# Patient Record
Sex: MALE | Race: WHITE | NOT HISPANIC OR LATINO | Employment: FULL TIME | URBAN - METROPOLITAN AREA
[De-identification: names, ages, dates, MRNs, and addresses within clinical notes are randomized per-mention and may not be internally consistent; named-entity substitution may affect disease eponyms.]

---

## 2017-09-07 ENCOUNTER — HOSPITAL ENCOUNTER (EMERGENCY)
Facility: MEDICAL CENTER | Age: 37
End: 2017-09-07
Attending: EMERGENCY MEDICINE
Payer: COMMERCIAL

## 2017-09-07 VITALS
WEIGHT: 257.94 LBS | SYSTOLIC BLOOD PRESSURE: 133 MMHG | BODY MASS INDEX: 34.04 KG/M2 | OXYGEN SATURATION: 95 % | HEART RATE: 95 BPM | RESPIRATION RATE: 18 BRPM | TEMPERATURE: 98.5 F | DIASTOLIC BLOOD PRESSURE: 87 MMHG

## 2017-09-07 DIAGNOSIS — K64.4 EXTERNAL HEMORRHOID, BLEEDING: ICD-10-CM

## 2017-09-07 LAB
ALBUMIN SERPL BCP-MCNC: 4.8 G/DL (ref 3.2–4.9)
ALBUMIN/GLOB SERPL: 1.6 G/DL
ALP SERPL-CCNC: 64 U/L (ref 30–99)
ALT SERPL-CCNC: 49 U/L (ref 2–50)
ANION GAP SERPL CALC-SCNC: 12 MMOL/L (ref 0–11.9)
AST SERPL-CCNC: 31 U/L (ref 12–45)
BASOPHILS # BLD AUTO: 0.4 % (ref 0–1.8)
BASOPHILS # BLD: 0.04 K/UL (ref 0–0.12)
BILIRUB SERPL-MCNC: 0.6 MG/DL (ref 0.1–1.5)
BUN SERPL-MCNC: 16 MG/DL (ref 8–22)
CALCIUM SERPL-MCNC: 9.8 MG/DL (ref 8.5–10.5)
CHLORIDE SERPL-SCNC: 104 MMOL/L (ref 96–112)
CO2 SERPL-SCNC: 24 MMOL/L (ref 20–33)
CREAT SERPL-MCNC: 0.73 MG/DL (ref 0.5–1.4)
EOSINOPHIL # BLD AUTO: 0.08 K/UL (ref 0–0.51)
EOSINOPHIL NFR BLD: 0.8 % (ref 0–6.9)
ERYTHROCYTE [DISTWIDTH] IN BLOOD BY AUTOMATED COUNT: 41.5 FL (ref 35.9–50)
GFR SERPL CREATININE-BSD FRML MDRD: >60 ML/MIN/1.73 M 2
GLOBULIN SER CALC-MCNC: 3 G/DL (ref 1.9–3.5)
GLUCOSE SERPL-MCNC: 86 MG/DL (ref 65–99)
HCT VFR BLD AUTO: 42.4 % (ref 42–52)
HGB BLD-MCNC: 15.1 G/DL (ref 14–18)
IMM GRANULOCYTES # BLD AUTO: 0.02 K/UL (ref 0–0.11)
IMM GRANULOCYTES NFR BLD AUTO: 0.2 % (ref 0–0.9)
LIPASE SERPL-CCNC: 7 U/L (ref 11–82)
LYMPHOCYTES # BLD AUTO: 1.97 K/UL (ref 1–4.8)
LYMPHOCYTES NFR BLD: 20.9 % (ref 22–41)
MCH RBC QN AUTO: 32 PG (ref 27–33)
MCHC RBC AUTO-ENTMCNC: 35.6 G/DL (ref 33.7–35.3)
MCV RBC AUTO: 89.8 FL (ref 81.4–97.8)
MONOCYTES # BLD AUTO: 0.63 K/UL (ref 0–0.85)
MONOCYTES NFR BLD AUTO: 6.7 % (ref 0–13.4)
NEUTROPHILS # BLD AUTO: 6.69 K/UL (ref 1.82–7.42)
NEUTROPHILS NFR BLD: 71 % (ref 44–72)
NRBC # BLD AUTO: 0 K/UL
NRBC BLD AUTO-RTO: 0 /100 WBC
PLATELET # BLD AUTO: 253 K/UL (ref 164–446)
PMV BLD AUTO: 9.7 FL (ref 9–12.9)
POTASSIUM SERPL-SCNC: 3.5 MMOL/L (ref 3.6–5.5)
PROT SERPL-MCNC: 7.8 G/DL (ref 6–8.2)
RBC # BLD AUTO: 4.72 M/UL (ref 4.7–6.1)
SODIUM SERPL-SCNC: 140 MMOL/L (ref 135–145)
WBC # BLD AUTO: 9.4 K/UL (ref 4.8–10.8)

## 2017-09-07 PROCEDURE — 85025 COMPLETE CBC W/AUTO DIFF WBC: CPT

## 2017-09-07 PROCEDURE — 80053 COMPREHEN METABOLIC PANEL: CPT

## 2017-09-07 PROCEDURE — 36415 COLL VENOUS BLD VENIPUNCTURE: CPT

## 2017-09-07 PROCEDURE — 46600 DIAGNOSTIC ANOSCOPY SPX: CPT

## 2017-09-07 PROCEDURE — 83690 ASSAY OF LIPASE: CPT

## 2017-09-07 PROCEDURE — 99283 EMERGENCY DEPT VISIT LOW MDM: CPT

## 2017-09-07 ASSESSMENT — PAIN SCALES - GENERAL: PAINLEVEL_OUTOF10: 2

## 2017-09-07 NOTE — ED NOTES
Abdominal pain protocol ordered.  Blood drawn and sent to lab.  Vitals reassessed.  Pt updated on wait time and escorted back to lobby.

## 2017-09-07 NOTE — ED NOTES
"Chief Complaint   Patient presents with   • Bloody Stools     pt reports Hx hemorrhoids, today he had \"blackish jelly stuff,\" in stool. has been having some abdominal and flank pain worse on left.      Pt is PWD.   Blood pressure 147/103, pulse (!) 108, temperature 37.2 °C (99 °F), resp. rate 14, weight 117 kg (257 lb 15 oz), SpO2 95 %.    Pt informed of wait times. Educated on triage process.  Asked to return to triage RN for any new or worsening of symptoms. Thanked for patience.        "

## 2017-09-08 NOTE — DISCHARGE INSTRUCTIONS
Fiber Content in Foods  Drinking plenty of fluids and consuming foods high in fiber can help with constipation. See the list below for the fiber content of some common foods.  Starches and Grains / Dietary Fiber (g)  · Cheerios, 1 cup / 3 g  · An[employer]'s Corn Flakes, 1 cup / 0.7 g  · Rice Krispies, 1 ¼ cup / 0.3 g  · [employer] Oat Life Cereal, ¾ cup / 2.1 g  · Oatmeal, instant (cooked), ½ cup / 2 g  · Joan's Frosted Mini Wheats, 1 cup / 5.1 g  · Rice, brown, long-grain (cooked), 1 cup / 3.5 g  · Rice, white, long-grain (cooked), 1 cup / 0.6 g  · Macaroni, cooked, enriched, 1 cup / 2.5 g  Legumes / Dietary Fiber (g)  · Beans, baked, canned, plain or vegetarian, ½ cup / 5.2 g  · Beans, kidney, canned, ½ cup / 6.8 g  · Beans, hernandez, dried (cooked), ½ cup / 7.7 g  · Beans, hernandez, canned, ½ cup / 5.5 g  Breads and Crackers / Dietary Fiber (g)  · Luc crackers, plain or honey, 2 squares / 0.7 g  · Saltine crackers, 3 squares / 0.3 g  · Pretzels, plain, salted, 10 pieces / 1.8 g  · Bread, whole-wheat, 1 slice / 1.9 g  · Bread, white, 1 slice / 0.7 g  · Bread, raisin, 1 slice / 1.2 g  · Bagel, plain, 3 oz / 2 g  · Tortilla, flour, 1 oz / 0.9 g  · Tortilla, corn, 1 small / 1.5 g  · Bun, hamburger or hotdog, 1 small / 0.9 g  Fruits / Dietary Fiber (g)  · Apple, raw with skin, 1 medium / 4.4 g  · Applesauce, sweetened, ½ cup / 1.5 g  · Banana, ½ medium / 1.5 g  · Grapes, 10 grapes / 0.4 g  · Orange, 1 small / 2.3 g  · Raisin, 1.5 oz / 1.6 g  · Melon, 1 cup / 1.4 g  Vegetables / Dietary Fiber (g)  · Green beans, canned, ½ cup / 1.3 g  · Carrots (cooked), ½ cup / 2.3 g  · Broccoli (cooked), ½ cup / 2.8 g  · Peas, frozen (cooked), ½ cup / 4.4 g  · Potatoes, mashed, ½ cup / 1.6 g  · Lettuce, 1 cup / 0.5 g  · Corn, canned, ½ cup / 1.6 g  · Tomato, ½ cup / 1.1 g  Document Released: 05/05/2008 Document Revised: 03/11/2013 Document Reviewed: 06/30/2008  ExitCare® Patient Information ©2014 [employer], [facility].    Hemorrhoids  Hemorrhoids  are swollen veins around the rectum or anus. There are two types of hemorrhoids:   · Internal hemorrhoids. These occur in the veins just inside the rectum. They may poke through to the outside and become irritated and painful.  · External hemorrhoids. These occur in the veins outside the anus and can be felt as a painful swelling or hard lump near the anus.  CAUSES  · Pregnancy.    · Obesity.    · Constipation or diarrhea.    · Straining to have a bowel movement.    · Sitting for long periods on the toilet.  · Heavy lifting or other activity that caused you to strain.  · Anal intercourse.  SYMPTOMS   · Pain.    · Anal itching or irritation.    · Rectal bleeding.    · Fecal leakage.    · Anal swelling.    · One or more lumps around the anus.    DIAGNOSIS   Your caregiver may be able to diagnose hemorrhoids by visual examination. Other examinations or tests that may be performed include:   · Examination of the rectal area with a gloved hand (digital rectal exam).    · Examination of anal canal using a small tube (scope).    · A blood test if you have lost a significant amount of blood.  · A test to look inside the colon (sigmoidoscopy or colonoscopy).  TREATMENT  Most hemorrhoids can be treated at home. However, if symptoms do not seem to be getting better or if you have a lot of rectal bleeding, your caregiver may perform a procedure to help make the hemorrhoids get smaller or remove them completely. Possible treatments include:   · Placing a rubber band at the base of the hemorrhoid to cut off the circulation (rubber band ligation).    · Injecting a chemical to shrink the hemorrhoid (sclerotherapy).    · Using a tool to burn the hemorrhoid (infrared light therapy).    · Surgically removing the hemorrhoid (hemorrhoidectomy).    · Stapling the hemorrhoid to block blood flow to the tissue (hemorrhoid stapling).    HOME CARE INSTRUCTIONS   · Eat foods with fiber, such as whole grains, beans, nuts, fruits, and  vegetables. Ask your doctor about taking products with added fiber in them (fiber supplements).  · Increase fluid intake. Drink enough water and fluids to keep your urine clear or pale yellow.    · Exercise regularly.    · Go to the bathroom when you have the urge to have a bowel movement. Do not wait.    · Avoid straining to have bowel movements.    · Keep the anal area dry and clean. Use wet toilet paper or moist towelettes after a bowel movement.    · Medicated creams and suppositories may be used or applied as directed.    · Only take over-the-counter or prescription medicines as directed by your caregiver.    · Take warm sitz baths for 15-20 minutes, 3-4 times a day to ease pain and discomfort.    · Place ice packs on the hemorrhoids if they are tender and swollen. Using ice packs between sitz baths may be helpful.    ¨ Put ice in a plastic bag.    ¨ Place a towel between your skin and the bag.    ¨ Leave the ice on for 15-20 minutes, 3-4 times a day.    · Do not use a donut-shaped pillow or sit on the toilet for long periods. This increases blood pooling and pain.    SEEK MEDICAL CARE IF:  · You have increasing pain and swelling that is not controlled by treatment or medicine.  · You have uncontrolled bleeding.  · You have difficulty or you are unable to have a bowel movement.  · You have pain or inflammation outside the area of the hemorrhoids.  MAKE SURE YOU:  · Understand these instructions.  · Will watch your condition.  · Will get help right away if you are not doing well or get worse.     This information is not intended to replace advice given to you by your health care provider. Make sure you discuss any questions you have with your health care provider.     Document Released: 12/15/2001 Document Revised: 12/04/2013 Document Reviewed: 10/22/2013  [employer] Interactive Patient Education ©2016 [employer]    How to Take a Sitz Bath  A sitz bath is a warm water bath that is taken while you are sitting  down. The water should only come up to your hips and should cover your buttocks. Your health care provider may recommend a sitz bath to help you:   · Clean the lower part of your body, including your genital area.  · With itching.  · With pain.  · With sore muscles or muscles that tighten or spasm.  HOW TO TAKE A SITZ BATH  Take 3-4 sitz baths per day or as told by your health care provider.  1. Partially fill a bathtub with warm water. You will only need the water to be deep enough to cover your hips and buttocks when you are sitting in it.  2. If your health care provider told you to put medicine in the water, follow the directions exactly.  3. Sit in the water and open the tub drain a little.  4. Turn on the warm water again to keep the tub at the correct level. Keep the water running constantly.  5. Soak in the water for 15-20 minutes or as told by your health care provider.  6. After the sitz bath, pat the affected area dry first. Do not rub it.  7. Be careful when you stand up after the sitz bath because you may feel dizzy.  SEEK MEDICAL CARE IF:  · Your symptoms get worse. Do not continue with sitz baths if your symptoms get worse.  · You have new symptoms. Do not continue with sitz baths until you talk with your health care provider.     This information is not intended to replace advice given to you by your health care provider. Make sure you discuss any questions you have with your health care provider.     Document Released: 09/09/2005 Document Revised: 05/03/2016 Document Reviewed: 12/16/2015  [employer] Interactive Patient Education ©2016 [employer]

## 2017-09-08 NOTE — ED NOTES
Discharge instructions provided & verbalized understanding of follow-up care & reasons to return to ED.  Released in stable condition.

## 2017-09-08 NOTE — ED PROVIDER NOTES
"ED Provider Note    Scribed for Nedra Tang M.D. by Thai Purdy. 9/7/2017, 10:40 PM.    Primary care provider: Pt States None  Means of arrival: Walk-in  History obtained from: Patient  History limited by: None    CHIEF COMPLAINT  Chief Complaint   Patient presents with   • Bloody Stools     pt reports Hx hemorrhoids, today he had \"blackish jelly stuff,\" in stool. has been having some abdominal and flank pain worse on left.        HPI  Simone Sanchez is a 36 y.o. male who presents to the Emergency Department with blood in stool. Patient was at work today when he had a bowel movement and noticed blood on the tissue. He describes this as \"black jellylike substance\" on the tissue though is only a small amount. The patient has also had diarrhea and intermittent lower abdominal discomfort for five days. He denies fever, chills, nausea, vomiting, weakness, dizziness, or other symptoms. Patient has a history of hemorrhoids    REVIEW OF SYSTEMS  See HPI for further details. All other systems are negative. C.    PAST MEDICAL HISTORY   Hemorrhoids     SURGICAL HISTORY  patient denies any surgical history    SOCIAL HISTORY  Social History   Substance Use Topics   • Smoking status: Former Smoker     Years: 15.00     Quit date: 4/21/2013   • Alcohol use Yes      Comment: 2 beers in a week.      History   Drug Use No       FAMILY HISTORY  No contributing family history noted.     CURRENT MEDICATIONS  Reviewed. See Encounter Summary.     ALLERGIES  No Known Allergies    PHYSICAL EXAM  VITAL SIGNS: /94   Pulse 87   Temp 36.9 °C (98.5 °F)   Resp 16   Wt 117 kg (257 lb 15 oz)   SpO2 95%   BMI 34.04 kg/m²    Pulse ox interpretation: I interpret this pulse ox as normal.  Constitutional: Alert in no apparent distress.  HENT: No signs of trauma, Bilateral external ears normal, Nose normal.   Eyes: Pupils are equal and reactive, Conjunctiva normal, Non-icteric.   Cardiovascular: Regular rate and rhythm, no murmurs. "   Thorax & Lungs: Normal breath sounds, No respiratory distress, No wheezing, No chest tenderness.   Abdomen: Bowel sounds normal, Soft, questionable mild left lower quadrant tenderness, No masses, No pulsatile masses. No peritoneal signs.  Skin: Warm, Dry, No erythema, No rash.   Extremities: Intact distal pulses, No edema, No tenderness, No cyanosis,  Negative Angela's sign.   Musculoskeletal: Good range of motion in all major joints. No tenderness to palpation or major deformities noted.   Rectal: 3 mm external hemorrhoid at the 3 o'clock position. Several other external hemorrhoids noted that are not actively bleeding and are not thrombosed.   Neurologic: Alert , Normal motor function, Normal sensory function, No focal deficits noted.   Psychiatric: Affect normal, Judgment normal, Mood normal.         DIFFERENTIAL DIAGNOSIS AND WORK UP PLAN    10:40 PM Patient seen and examined at bedside. I explained to the patient his labs appear normal so far and that I will complete a rectal exam to evaluate. The patient presents with hematochezia and the differential diagnosis includes but is not limited to internal vs hemorrhoids, colitis, diverticulitis, less likely GI bleed. Initial orders included eGFR, CBC, CMP, lipase, POC urinalysis.     10:54 PM   Anoscopy Procedure  Indication: Rectal bleeding    Procedure: The patient was placed in the right lateral decubitus position.  External inspection of the rectum and perianal area revealed an external hemorrhoid at the 3 o'clock position not thrombosed mildly oozing 3mm.  A digital rectal exam was not performed. The anoscope was gently inserted and the bowel was inspected.  Visualization was excellent.  Mucosa was normal.  Anoscopy revealed an internal hemorrhoid at the 6 o'clock position not bleeding.    The patient tolerated the procedure well.    Complication: None       DIAGNOSTIC STUDIES / PROCEDURES     LABS  CBC CMP within normal limits lipase negative no evidence of  anemia or acute kidney injury or evidence of infection  All labs were reviewed by me.    COURSE & MEDICAL DECISION MAKING  Pertinent Labs & Imaging studies reviewed. (See chart for details)      Rectal exam completed with findings outlined above with evidence of a small mildly using external hemorrhoid without evidence of thrombosis and an internal hemorrhoid without evidence of bleeding. I explained that he is now stable for discharge and we discussed sitz baths for alleviation of his symptoms. I advised him to follow up with his primary care provider and to return to the ED for fever, worsening symptoms, or any other medical concerns. He understands and will comply.    /87   Pulse 95   Temp 36.9 °C (98.5 °F)   Resp 18   Wt 117 kg (257 lb 15 oz)   SpO2 95%   BMI 34.04 kg/m²     The patient will return for new or worsening symptoms and is stable at the time of discharge.    The patient is referred to a primary physician for blood pressure management, diabetic screening, and for all other preventative health concerns.    DISPOSITION:  Patient will be discharged home in stable condition.    FOLLOW UP:  [address] [id number] [phone]  Schedule an appointment as soon as possible for a visit  for reassessment and blood pressure management    [facility], Emergency Dept  [address] [facility] [id number] [phone]    If symptoms worsen        FINAL IMPRESSION  1. External hemorrhoid, bleeding          Thai AGUILAR (Scribe), am scribing for, and in the presence of, Nedra Tang M.D..    Electronically signed by: Thai Purdy ([place]), 9/7/2017    Nedra AGUILAR M.D. personally performed the services described in this documentation, as scribed by Thai Purdy in my presence, and it is both accurate and complete.    The note accurately reflects work and decisions made by me.  Nedra Tang  9/8/2017  1:19 AM    This  dictation has been created using voice recognition software and/or scribes. The accuracy of the dictation is limited by the abilities of the software and the expertise of the scribes. I expect there may be some errors of grammar and possibly content. I made every attempt to manually correct the errors within my dictation. However, errors related to voice recognition software and/or scribes may still exist and should be interpreted within the appropriate context.

## 2019-08-08 ENCOUNTER — HOSPITAL ENCOUNTER (EMERGENCY)
Facility: MEDICAL CENTER | Age: 39
End: 2019-08-08
Attending: EMERGENCY MEDICINE

## 2019-08-08 ENCOUNTER — APPOINTMENT (OUTPATIENT)
Dept: RADIOLOGY | Facility: MEDICAL CENTER | Age: 39
End: 2019-08-08
Attending: EMERGENCY MEDICINE

## 2019-08-08 ENCOUNTER — APPOINTMENT (OUTPATIENT)
Dept: RADIOLOGY | Facility: MEDICAL CENTER | Age: 39
End: 2019-08-08
Attending: NURSE PRACTITIONER

## 2019-08-08 VITALS
RESPIRATION RATE: 16 BRPM | WEIGHT: 254.41 LBS | HEIGHT: 73 IN | BODY MASS INDEX: 33.72 KG/M2 | OXYGEN SATURATION: 94 % | HEART RATE: 75 BPM | SYSTOLIC BLOOD PRESSURE: 152 MMHG | TEMPERATURE: 98.1 F | DIASTOLIC BLOOD PRESSURE: 87 MMHG

## 2019-08-08 DIAGNOSIS — R51.9 ACUTE NONINTRACTABLE HEADACHE, UNSPECIFIED HEADACHE TYPE: ICD-10-CM

## 2019-08-08 DIAGNOSIS — R47.9 DIFFICULTY WITH SPEECH: ICD-10-CM

## 2019-08-08 LAB
ABO GROUP BLD: NORMAL
ALBUMIN SERPL BCP-MCNC: 4.7 G/DL (ref 3.2–4.9)
ALBUMIN/GLOB SERPL: 1.5 G/DL
ALP SERPL-CCNC: 67 U/L (ref 30–99)
ALT SERPL-CCNC: 48 U/L (ref 2–50)
ANION GAP SERPL CALC-SCNC: 7 MMOL/L (ref 0–11.9)
APPEARANCE UR: CLEAR
APTT PPP: 29.8 SEC (ref 24.7–36)
AST SERPL-CCNC: 26 U/L (ref 12–45)
BASOPHILS # BLD AUTO: 0.3 % (ref 0–1.8)
BASOPHILS # BLD: 0.03 K/UL (ref 0–0.12)
BILIRUB SERPL-MCNC: 0.6 MG/DL (ref 0.1–1.5)
BILIRUB UR QL STRIP.AUTO: NEGATIVE
BLD GP AB SCN SERPL QL: NORMAL
BUN SERPL-MCNC: 14 MG/DL (ref 8–22)
CALCIUM SERPL-MCNC: 10 MG/DL (ref 8.5–10.5)
CHLORIDE SERPL-SCNC: 102 MMOL/L (ref 96–112)
CO2 SERPL-SCNC: 27 MMOL/L (ref 20–33)
COLOR UR: YELLOW
CREAT SERPL-MCNC: 0.98 MG/DL (ref 0.5–1.4)
EKG IMPRESSION: NORMAL
EOSINOPHIL # BLD AUTO: 0.04 K/UL (ref 0–0.51)
EOSINOPHIL NFR BLD: 0.5 % (ref 0–6.9)
ERYTHROCYTE [DISTWIDTH] IN BLOOD BY AUTOMATED COUNT: 42.2 FL (ref 35.9–50)
GLOBULIN SER CALC-MCNC: 3.1 G/DL (ref 1.9–3.5)
GLUCOSE SERPL-MCNC: 137 MG/DL (ref 65–99)
GLUCOSE UR STRIP.AUTO-MCNC: 500 MG/DL
HCT VFR BLD AUTO: 44.1 % (ref 42–52)
HGB BLD-MCNC: 14.8 G/DL (ref 14–18)
IMM GRANULOCYTES # BLD AUTO: 0.03 K/UL (ref 0–0.11)
IMM GRANULOCYTES NFR BLD AUTO: 0.3 % (ref 0–0.9)
INR PPP: 1.03 (ref 0.87–1.13)
KETONES UR STRIP.AUTO-MCNC: NEGATIVE MG/DL
LEUKOCYTE ESTERASE UR QL STRIP.AUTO: NEGATIVE
LYMPHOCYTES # BLD AUTO: 1.35 K/UL (ref 1–4.8)
LYMPHOCYTES NFR BLD: 15.3 % (ref 22–41)
MCH RBC QN AUTO: 30.8 PG (ref 27–33)
MCHC RBC AUTO-ENTMCNC: 33.6 G/DL (ref 33.7–35.3)
MCV RBC AUTO: 91.7 FL (ref 81.4–97.8)
MICRO URNS: ABNORMAL
MONOCYTES # BLD AUTO: 0.72 K/UL (ref 0–0.85)
MONOCYTES NFR BLD AUTO: 8.2 % (ref 0–13.4)
NEUTROPHILS # BLD AUTO: 6.65 K/UL (ref 1.82–7.42)
NEUTROPHILS NFR BLD: 75.4 % (ref 44–72)
NITRITE UR QL STRIP.AUTO: NEGATIVE
NRBC # BLD AUTO: 0 K/UL
NRBC BLD-RTO: 0 /100 WBC
PH UR STRIP.AUTO: 5.5 [PH] (ref 5–8)
PLATELET # BLD AUTO: 235 K/UL (ref 164–446)
PMV BLD AUTO: 9.4 FL (ref 9–12.9)
POTASSIUM SERPL-SCNC: 3.8 MMOL/L (ref 3.6–5.5)
PROT SERPL-MCNC: 7.8 G/DL (ref 6–8.2)
PROT UR QL STRIP: NEGATIVE MG/DL
PROTHROMBIN TIME: 13.8 SEC (ref 12–14.6)
RBC # BLD AUTO: 4.81 M/UL (ref 4.7–6.1)
RBC UR QL AUTO: NEGATIVE
RH BLD: NORMAL
SODIUM SERPL-SCNC: 136 MMOL/L (ref 135–145)
SP GR UR STRIP.AUTO: 1.04
TROPONIN T SERPL-MCNC: <6 NG/L (ref 6–19)
UROBILINOGEN UR STRIP.AUTO-MCNC: 0.2 MG/DL
WBC # BLD AUTO: 8.8 K/UL (ref 4.8–10.8)

## 2019-08-08 PROCEDURE — 99244 OFF/OP CNSLTJ NEW/EST MOD 40: CPT

## 2019-08-08 PROCEDURE — 70498 CT ANGIOGRAPHY NECK: CPT

## 2019-08-08 PROCEDURE — 70496 CT ANGIOGRAPHY HEAD: CPT

## 2019-08-08 PROCEDURE — 700117 HCHG RX CONTRAST REV CODE 255: Performed by: EMERGENCY MEDICINE

## 2019-08-08 PROCEDURE — 99284 EMERGENCY DEPT VISIT MOD MDM: CPT

## 2019-08-08 PROCEDURE — 700105 HCHG RX REV CODE 258: Performed by: NURSE PRACTITIONER

## 2019-08-08 PROCEDURE — 85730 THROMBOPLASTIN TIME PARTIAL: CPT

## 2019-08-08 PROCEDURE — 70450 CT HEAD/BRAIN W/O DYE: CPT

## 2019-08-08 PROCEDURE — 71045 X-RAY EXAM CHEST 1 VIEW: CPT

## 2019-08-08 PROCEDURE — 84484 ASSAY OF TROPONIN QUANT: CPT

## 2019-08-08 PROCEDURE — 86901 BLOOD TYPING SEROLOGIC RH(D): CPT

## 2019-08-08 PROCEDURE — 700111 HCHG RX REV CODE 636 W/ 250 OVERRIDE (IP): Performed by: NURSE PRACTITIONER

## 2019-08-08 PROCEDURE — 85610 PROTHROMBIN TIME: CPT

## 2019-08-08 PROCEDURE — 86850 RBC ANTIBODY SCREEN: CPT

## 2019-08-08 PROCEDURE — 36415 COLL VENOUS BLD VENIPUNCTURE: CPT

## 2019-08-08 PROCEDURE — 96375 TX/PRO/DX INJ NEW DRUG ADDON: CPT

## 2019-08-08 PROCEDURE — 94760 N-INVAS EAR/PLS OXIMETRY 1: CPT

## 2019-08-08 PROCEDURE — 96366 THER/PROPH/DIAG IV INF ADDON: CPT

## 2019-08-08 PROCEDURE — 85025 COMPLETE CBC W/AUTO DIFF WBC: CPT

## 2019-08-08 PROCEDURE — 80053 COMPREHEN METABOLIC PANEL: CPT

## 2019-08-08 PROCEDURE — 96365 THER/PROPH/DIAG IV INF INIT: CPT

## 2019-08-08 PROCEDURE — 81003 URINALYSIS AUTO W/O SCOPE: CPT

## 2019-08-08 PROCEDURE — 0042T CT-CEREBRAL PERFUSION ANALYSIS: CPT

## 2019-08-08 PROCEDURE — 86900 BLOOD TYPING SEROLOGIC ABO: CPT

## 2019-08-08 PROCEDURE — 70551 MRI BRAIN STEM W/O DYE: CPT

## 2019-08-08 PROCEDURE — 93005 ELECTROCARDIOGRAM TRACING: CPT | Performed by: EMERGENCY MEDICINE

## 2019-08-08 RX ORDER — DIPHENHYDRAMINE HYDROCHLORIDE 50 MG/ML
25 INJECTION INTRAMUSCULAR; INTRAVENOUS ONCE
Status: COMPLETED | OUTPATIENT
Start: 2019-08-08 | End: 2019-08-08

## 2019-08-08 RX ORDER — MAGNESIUM SULFATE HEPTAHYDRATE 40 MG/ML
2 INJECTION, SOLUTION INTRAVENOUS ONCE
Status: COMPLETED | OUTPATIENT
Start: 2019-08-08 | End: 2019-08-08

## 2019-08-08 RX ORDER — SODIUM CHLORIDE 9 MG/ML
1000 INJECTION, SOLUTION INTRAVENOUS ONCE
Status: COMPLETED | OUTPATIENT
Start: 2019-08-08 | End: 2019-08-08

## 2019-08-08 RX ADMIN — PROCHLORPERAZINE EDISYLATE 10 MG: 5 INJECTION INTRAMUSCULAR; INTRAVENOUS at 15:28

## 2019-08-08 RX ADMIN — MAGNESIUM SULFATE IN WATER 2 G: 40 INJECTION, SOLUTION INTRAVENOUS at 15:30

## 2019-08-08 RX ADMIN — IOHEXOL 100 ML: 350 INJECTION, SOLUTION INTRAVENOUS at 14:50

## 2019-08-08 RX ADMIN — DIPHENHYDRAMINE HYDROCHLORIDE 25 MG: 50 INJECTION INTRAMUSCULAR; INTRAVENOUS at 15:27

## 2019-08-08 RX ADMIN — SODIUM CHLORIDE 1000 ML: 9 INJECTION, SOLUTION INTRAVENOUS at 15:26

## 2019-08-08 ASSESSMENT — LIFESTYLE VARIABLES: DO YOU DRINK ALCOHOL: NO

## 2019-08-08 NOTE — ED TRIAGE NOTES
".  Chief Complaint   Patient presents with   • Headache     right side \"weird feeling\"   • Difficulty Swallowing     ./98   Pulse 98   Temp 36.7 °C (98.1 °F) (Temporal)   Resp 18   Ht 1.854 m (6' 1\")   Wt 115.4 kg (254 lb 6.6 oz)   SpO2 92%   BMI 33.57 kg/m²     BIB EMS with above complaints, reports \"weird sensation\" starting in right head and traveling down neck, difficulty swallowing, (+) nausea, symptoms began at 1215, denies N/T at this time, no facial droop, no slurred speech noted, POOLE =  "

## 2019-08-08 NOTE — CONSULTS
"Chief Complaint   Patient presents with   • Headache     right side \"weird feeling\"   • Difficulty Swallowing       Problem List Items Addressed This Visit     None      Neurology Stroke Alert Consultation     History of present illness:  This is a 38-year old male with PMhx significant for hypertension and tobacco abuse who presented to [facility] on 8/8/19 for a chief complaint of Right sided headache, with associated Right facial and Right hand tingling/abnormal sensation. The patient was reportedly in his usual state of health this afternoon when he suddenly (aroun 1215) began to experience the above symptoms; also had mild associated nausea without vomiting. Symptoms persisted and did not improve thus patient called EMS and was brought to ED for further evaluation. At time of presentation here, /98, . CT head revealed no acute intracranial abnormality. CTA head/neck without LVO or other acute vascular process. Patient ultimately determined not to be a candidate for IV tPA secondary to mild/non disabling deficits, NIHSS 0.   Currently, patient is sitting up in stretcher, awake and alert. Admits to persistent headache as above, mildly improved; continues to have mild associated nausea as well as mild photophobia/light sensitivity. Denies problem with vision (double vision, blurred vision, loss of vision or visual scotoma), speech or swallowing. Denies weakness or numbness to any part of the body; sensation to Right hand (lateral aspect) is resolving. Denies recent or remote history of falls or trauma/head trauma. Denies history of previous similar episodes, history of stroke or history of migraine/migraine with stroke symptoms.     Neurology has been consulted by Dr. Micheal Porras to further evaluate the findings noted above.     Past medical history:   As noted above.     Past surgical history:   Non contributory.     Family history:   No family history of migraines or stroke.     Social " history:   Social History     Socioeconomic History   • Marital status: Single     Spouse name: Not on file   • Number of children: Not on file   • Years of education: Not on file   • Highest education level: Not on file   Occupational History   • Not on file   Social Needs   • Financial resource strain: Not on file   • Food insecurity:     Worry: Not on file     Inability: Not on file   • Transportation needs:     Medical: Not on file     Non-medical: Not on file   Tobacco Use   • Smoking status: Former Smoker     Years: 15.00     Last attempt to quit: 2013     Years since quittin.3   Substance and Sexual Activity   • Alcohol use: Yes     Comment: 2 beers in a week.   • Drug use: No   • Sexual activity: Not on file   Lifestyle   • Physical activity:     Days per week: Not on file     Minutes per session: Not on file   • Stress: Not on file   Relationships   • Social connections:     Talks on phone: Not on file     Gets together: Not on file     Attends Mandaeism service: Not on file     Active member of club or organization: Not on file     Attends meetings of clubs or organizations: Not on file     Relationship status: Not on file   • Intimate partner violence:     Fear of current or ex partner: Not on file     Emotionally abused: Not on file     Physically abused: Not on file     Forced sexual activity: Not on file   Other Topics Concern   • Not on file   Social History Narrative   • Not on file       Current medications:   Current Facility-Administered Medications   Medication Dose   • magnesium sulfate IVPB premix 2 g  2 g     No current outpatient medications on file.       Medication Allergy:  No Known Allergies    Review of systems:   Constitutional: denies fever, night sweats, weight loss.   Eyes: denies acute vision change, eye pain or secretion.   Ears, Nose, Mouth, Throat: denies nasal secretion, nasal bleeding, difficulty swallowing, hearing loss, tinnitus, vertigo, ear pain, acute dental  "problems, oral ulcers or lesions.   Endocrine: denies recent weight changes, heat or cold intolerance, polyuria, polydypsia, polyphagia,abnormal hair growth.  Cardiovascular: denies new onset of chest pain, palpitations, syncope, or dyspnea of exertion.  Pulmonary: denies shortness of breath, new onset of cough, hemoptysis, wheezing, chest pain or flu-like symptoms.   GI: denies nausea, vomiting, diarrhea, GI bleeding, change in appetite, abdominal pain, and change in bowel habits.  : denies dysuria, urinary incontinence, hematuria.  Heme/oncology: denies history of easy bruising or bleeding. No history of cancer, DVTor PE.  Allergy/immunology: denies hives/urticaria, or itching.   Dermatologic: denies new rash, or new skin lesions.  Musculoskeletal:denies joint swelling or pain, muscle pain, neck and back pain.   Neurologic:As noted above.   Psychiatric: denies symptoms of depression, anxiety, hallucinations, mood swings or changes, suicidal or homicidal thoughts.     Physical examination:   Vitals:    08/08/19 1413 08/08/19 1416   BP: 140/98    Pulse: 98    Resp: 18    Temp: 36.7 °C (98.1 °F)    TempSrc: Temporal    SpO2: 92%    Weight:  115.4 kg (254 lb 6.6 oz)   Height: 1.854 m (6' 1\")      General: Patient in no acute distress  HEENT: Normocephalic, no signs of acute trauma.   Neck: supple, no meningeal signs or carotid bruits. There is normal range of motion. No tenderness on exam.   Chest: clear to auscultation. No cough.   CV: RRR, no murmurs.   Skin: no signs of acute rashes or trauma.   Musculoskeletal: joints exhibit full range of motion, without any pain to palpation. There are no signs of joint or muscle swelling. There is no tenderness to deep palpation of muscles.   Psychiatric: No hallucinatory behavior. Denies symptoms of depression or suicidal ideation. Mood and affect appear normal on exam.     NEUROLOGICAL EXAM:   Mental status, orientation: Awake, alert and fully oriented.   Speech and " language: speech is clear and fluent. The patient is able to name, repeat and comprehend.   Memory: There is intact recollection of recent and remote events.   Cranial nerve exam: Pupils are 3-4 mm bilaterally and equally reactive to light and accommodation. Visual fields are intact by confrontation. There is no nystagmus on primary or secondary gaze. Intact full EOM in all directions of gaze. Face appears symmetric. Sensation in the face is intact to light touch. Uvula is midline. Palate elevates symmetrically. Tongue is midline and without any signs of tongue biting or fasciculations. Shoulder shrug is intact bilaterally.   Motor exam: Strength is 5/5 in all extremities. Tone is normal. No abnormal movements were seen on exam.   Sensory exam reveals normal sense of light touch and pinprick in all extremities.   Deep tendon reflexes:  2+ throughout. Plantar responses are flexor. There is no clonus.   Coordination: shows a normal finger-nose-finger. Normal rapidly alternating movements.   Gait: The patient was able to get up from seated position on first attempt without requiring assistance. Found to be steady when walking.      NIH Stroke Scale    1a Level of Consciousness   1b Orientation Questions   1c Response to Commands   2 Gaze   3 Visual Fields   4 Facial Movement   5 Motor Function (arm)   a Left   b Right   6 Motor Function (leg)   a Left   b Right   7 Limb Ataxia   8 Sensory   9 Language   10 Articulation   11 Extinction/Inattention     Score: 0    ANCILLARY DATA REVIEWED:     Lab Data Review:  Recent Results (from the past 24 hour(s))   CBC WITH DIFFERENTIAL    Collection Time: 08/08/19  2:26 PM   Result Value Ref Range    WBC 8.8 4.8 - 10.8 K/uL    RBC 4.81 4.70 - 6.10 M/uL    Hemoglobin 14.8 14.0 - 18.0 g/dL    Hematocrit 44.1 42.0 - 52.0 %    MCV 91.7 81.4 - 97.8 fL    MCH 30.8 27.0 - 33.0 pg    MCHC 33.6 (L) 33.7 - 35.3 g/dL    RDW 42.2 35.9 - 50.0 fL    Platelet Count 235 164 - 446 K/uL    MPV 9.4  9.0 - 12.9 fL    Neutrophils-Polys 75.40 (H) 44.00 - 72.00 %    Lymphocytes 15.30 (L) 22.00 - 41.00 %    Monocytes 8.20 0.00 - 13.40 %    Eosinophils 0.50 0.00 - 6.90 %    Basophils 0.30 0.00 - 1.80 %    Immature Granulocytes 0.30 0.00 - 0.90 %    Nucleated RBC 0.00 /100 WBC    Neutrophils (Absolute) 6.65 1.82 - 7.42 K/uL    Lymphs (Absolute) 1.35 1.00 - 4.80 K/uL    Monos (Absolute) 0.72 0.00 - 0.85 K/uL    Eos (Absolute) 0.04 0.00 - 0.51 K/uL    Baso (Absolute) 0.03 0.00 - 0.12 K/uL    Immature Granulocytes (abs) 0.03 0.00 - 0.11 K/uL    NRBC (Absolute) 0.00 K/uL   COMP METABOLIC PANEL    Collection Time: 08/08/19  2:26 PM   Result Value Ref Range    Sodium 136 135 - 145 mmol/L    Potassium 3.8 3.6 - 5.5 mmol/L    Chloride 102 96 - 112 mmol/L    Co2 27 20 - 33 mmol/L    Anion Gap 7.0 0.0 - 11.9    Glucose 137 (H) 65 - 99 mg/dL    Bun 14 8 - 22 mg/dL    Creatinine 0.98 0.50 - 1.40 mg/dL    Calcium 10.0 8.5 - 10.5 mg/dL    AST(SGOT) 26 12 - 45 U/L    ALT(SGPT) 48 2 - 50 U/L    Alkaline Phosphatase 67 30 - 99 U/L    Total Bilirubin 0.6 0.1 - 1.5 mg/dL    Albumin 4.7 3.2 - 4.9 g/dL    Total Protein 7.8 6.0 - 8.2 g/dL    Globulin 3.1 1.9 - 3.5 g/dL    A-G Ratio 1.5 g/dL   PROTHROMBIN TIME    Collection Time: 08/08/19  2:26 PM   Result Value Ref Range    PT 13.8 12.0 - 14.6 sec    INR 1.03 0.87 - 1.13   APTT    Collection Time: 08/08/19  2:26 PM   Result Value Ref Range    APTT 29.8 24.7 - 36.0 sec   COD (ADULT)    Collection Time: 08/08/19  2:26 PM   Result Value Ref Range    ABO Grouping Only O     Rh Grouping Only POS     Antibody Screen-Cod NEG    TROPONIN    Collection Time: 08/08/19  2:26 PM   Result Value Ref Range    Troponin T <6 6 - 19 ng/L   ESTIMATED GFR    Collection Time: 08/08/19  2:26 PM   Result Value Ref Range    GFR If African American >60 >60 mL/min/1.73 m 2    GFR If Non African American >60 >60 mL/min/1.73 m 2   EKG (NOW)    Collection Time: 08/08/19  2:46 PM   Result Value Ref Range    Report        [facility] Emergency Dept.    Test Date:  2019  Pt Name:    ZEFERINO NAPOLES             Department: ER  MRN:        [id number]                      Room:        07  Gender:     Male                         Technician: [id number]  :        1980                   Requested By:LUDIVINA CAMACHO  Order #:    [id number]                    Reading MD:    Measurements  Intervals                                Axis  Rate:       86                           P:          26  OH:         164                          QRS:        -8  QRSD:       88                           T:          46  QT:         376  QTc:        450    Interpretive Statements  SINUS RHYTHM  No previous ECG available for comparison         Labs reviewed by me.       Imaging reviewed by me:     DX-CHEST-PORTABLE (1 VIEW)   Final Result      Mild cardiomegaly. No focal consolidation or pleural effusions.      CT-CTA HEAD WITH & W/O-POST PROCESS   Final Result      CT angiogram of the [place] within normal limits.      CT-CTA NECK WITH & W/O-POST PROCESSING   Final Result      No high-grade stenosis, large vessel occlusion, aneurysm or dissection.      CT-CEREBRAL PERFUSION ANALYSIS   Final Result      1.  Cerebral blood flow less than 30% likely representing completed infarct = 0 mL.      2.  T Max more than 6 seconds likely representing combination of completed infarct and ischemia = 0 mL.      3.  Mismatched volume likely representing ischemic brain/penumbra = 0 mL.      4.  Please note that the cerebral perfusion was performed on the limited brain tissue around the basal ganglia region. Infarct/ischemia outside the CT perfusion sections can be missed in this study.      CT-HEAD W/O   Final Result      No CT evidence of acute infarct, hemorrhage or mass.      MR-BRAIN-W/O    (Results Pending)       Presumed mechanism by TOAST:  __Large Artery Atherosclerosis  __Small Vessel (Lacunar)  __Cardioembolic  __Other (Sickle  Cell, Vasculitis, Hypercoagulable)  _X_Unknown      ASSESSMENT AND PLAN:  38-year old male with PMhx significant for hypertension and tobacco abuse who presented to [facility] on 8/8/19 for a chief complaint of Right sided headache, with associated Right facial and Right hand tingling/abnormal sensation; symptoms exclusively subjective and NIHSS 0. BP at time of presentation here 140/98, then up to 165/100. CT head unremarkable, CTA head and neck also without LVO; CT perfusion normal study. Patient ultimately determined not to be a candidate for IV tPA secondary to mild/non disabling symptoms and NIHSS 0. Differential diagnoses at this time include hypertensive urgency with associated headache vs. Migraine/atypical migraine (note patient admits to associated photophobia and nausea, more consistent with migraine); have low suspicion for an ischemic event such as TIA or CVA (symptoms are ipsilateral to headache thus do not well correlate with ischemia), regardless will proceed with MRI Brain, blood work to further rule out the above.     Impression:   Headache with stroke-like symptoms; primary headache such as migraine vs. Secondary/hypertensive etiology.   Hypertensive urgency.   Hyperglycemia.   History of Hypertension.     Recommendations/Plan:     -Obtain MRI Brain wo contrast.   -Will give NS Bolus, Toradol, Mg IV for headache/possible migraine headache.   -Goal BP <140/90; antihypertensives per ED/primary team.   -Note patient's  at time of presentation here, patient denies hx of diabetes; recommend to check Hemoglobin A1c, Lipid panel.   -If MRI is negative, may discharge home however will recommend prompt outpatient follow up with PCP and [facility] for further management/managment of vascular risk factors for stroke.     The plan of care above has been discussed with Dr. Ann.    Yanelis Marshall MSN, BSN, AGNP-C  [facility]

## 2019-08-08 NOTE — ED PROVIDER NOTES
"ED Provider Note    CHIEF COMPLAINT  Chief Complaint   Patient presents with   • Headache     right side \"weird feeling\"   • Difficulty Swallowing       HPI  Simone Sanchez is a 38 y.o. male who presents for evaluation of right-sided headache with some numbness and altered sensation involving his face and throat in the back of his tongue, difficulty swallowing as well as some word finding difficulties and tingling to his hand and foot on the right side.  No shortness of breath, no chest pain no abdominal pain.  Onset of symptoms was 12:15 PM, approximately 2 hours ago, he states overall symptoms are improving and have nearly resolved.  Denies any weakness of the extremities.  No medical problems, no other complaints    REVIEW OF SYSTEMS  Negative for fever, rash, chest pain, dyspnea, abdominal pain, nausea, vomiting, diarrhea, back pain. All other systems are negative.     PAST MEDICAL HISTORY  History reviewed. No pertinent past medical history.    FAMILY HISTORY  History reviewed. No pertinent family history.    SOCIAL HISTORY  Social History     Tobacco Use   • Smoking status: Former Smoker     Years: 15.00     Last attempt to quit: 2013     Years since quittin.3   Substance Use Topics   • Alcohol use: Yes     Comment: 2 beers in a week.   • Drug use: No       SURGICAL HISTORY  History reviewed. No pertinent surgical history.    CURRENT MEDICATIONS  I personally reviewed the medication list in the charting documentation.     ALLERGIES  No Known Allergies    MEDICAL RECORD  I have reviewed patient's medical record and pertinent results are listed above.      PHYSICAL EXAM  VITAL SIGNS: /98   Pulse 98   Temp 36.7 °C (98.1 °F) (Temporal)   Resp 18   Ht 1.854 m (6' 1\")   Wt 115.4 kg (254 lb 6.6 oz)   SpO2 92%   BMI 33.57 kg/m²    Constitutional: Well appearing patient in no acute distress.  Not toxic, nor ill in appearance.  HENT: Mucus membranes moist.    Eyes: No scleral icterus. Normal " conjunctiva   Neck: Supple, comfortable, nonpainful range of motion.   Cardiovascular: Regular heart rate and rhythm.   Thorax & Lungs: Chest is nontender.  Lungs are clear to auscultation with good air movement bilaterally.  No wheeze, rhonchi, nor rales.   Abdomen: Soft, with no tenderness, rebound nor guarding.  No mass or pulsatile mass appreciated.  Skin: Warm, dry. No rash appreciated  Extremities/Musculoskeletal: No sign of trauma. No asymmetric calf tenderness, erythema or edema. Normal range of motion   Neurologic: AAOx4, Cranial nerves II-XII grossly intact, PERRLA, EOMI, speech is normal, normal and symmetric motor and sensory functions upper and lower extremities bilaterally, finger to nose normal  Psychiatric: Normal affect appropriate for the clinical situation.    DIAGNOSTIC STUDIES / PROCEDURES    LABS/EKGs  Results for orders placed or performed during the hospital encounter of 08/08/19   CBC WITH DIFFERENTIAL   Result Value Ref Range    WBC 8.8 4.8 - 10.8 K/uL    RBC 4.81 4.70 - 6.10 M/uL    Hemoglobin 14.8 14.0 - 18.0 g/dL    Hematocrit 44.1 42.0 - 52.0 %    MCV 91.7 81.4 - 97.8 fL    MCH 30.8 27.0 - 33.0 pg    MCHC 33.6 (L) 33.7 - 35.3 g/dL    RDW 42.2 35.9 - 50.0 fL    Platelet Count 235 164 - 446 K/uL    MPV 9.4 9.0 - 12.9 fL    Neutrophils-Polys 75.40 (H) 44.00 - 72.00 %    Lymphocytes 15.30 (L) 22.00 - 41.00 %    Monocytes 8.20 0.00 - 13.40 %    Eosinophils 0.50 0.00 - 6.90 %    Basophils 0.30 0.00 - 1.80 %    Immature Granulocytes 0.30 0.00 - 0.90 %    Nucleated RBC 0.00 /100 WBC    Neutrophils (Absolute) 6.65 1.82 - 7.42 K/uL    Lymphs (Absolute) 1.35 1.00 - 4.80 K/uL    Monos (Absolute) 0.72 0.00 - 0.85 K/uL    Eos (Absolute) 0.04 0.00 - 0.51 K/uL    Baso (Absolute) 0.03 0.00 - 0.12 K/uL    Immature Granulocytes (abs) 0.03 0.00 - 0.11 K/uL    NRBC (Absolute) 0.00 K/uL   COMP METABOLIC PANEL   Result Value Ref Range    Sodium 136 135 - 145 mmol/L    Potassium 3.8 3.6 - 5.5 mmol/L     Chloride 102 96 - 112 mmol/L    Co2 27 20 - 33 mmol/L    Anion Gap 7.0 0.0 - 11.9    Glucose 137 (H) 65 - 99 mg/dL    Bun 14 8 - 22 mg/dL    Creatinine 0.98 0.50 - 1.40 mg/dL    Calcium 10.0 8.5 - 10.5 mg/dL    AST(SGOT) 26 12 - 45 U/L    ALT(SGPT) 48 2 - 50 U/L    Alkaline Phosphatase 67 30 - 99 U/L    Total Bilirubin 0.6 0.1 - 1.5 mg/dL    Albumin 4.7 3.2 - 4.9 g/dL    Total Protein 7.8 6.0 - 8.2 g/dL    Globulin 3.1 1.9 - 3.5 g/dL    A-G Ratio 1.5 g/dL   PROTHROMBIN TIME   Result Value Ref Range    PT 13.8 12.0 - 14.6 sec    INR 1.03 0.87 - 1.13   APTT   Result Value Ref Range    APTT 29.8 24.7 - 36.0 sec   COD (ADULT)   Result Value Ref Range    ABO Grouping Only O     Rh Grouping Only POS     Antibody Screen-Cod NEG    TROPONIN   Result Value Ref Range    Troponin T <6 6 - 19 ng/L   URINALYSIS CULTURE, IF INDICATED   Result Value Ref Range    Color Yellow     Character Clear     Specific Gravity 1.044 <1.035    Ph 5.5 5.0 - 8.0    Glucose 500 (A) Negative mg/dL    Ketones Negative Negative mg/dL    Protein Negative Negative mg/dL    Bilirubin Negative Negative    Urobilinogen, Urine 0.2 Negative    Nitrite Negative Negative    Leukocyte Esterase Negative Negative    Occult Blood Negative Negative    Micro Urine Req see below    ESTIMATED GFR   Result Value Ref Range    GFR If African American >60 >60 mL/min/1.73 m 2    GFR If Non African American >60 >60 mL/min/1.73 m 2   EKG (NOW)   Result Value Ref Range    Report       [facility] Emergency Dept.    Test Date:  2019  Pt Name:    ZEFERINO NAPOLES             Department: ER  MRN:        [id number]                      Room:       RD 07  Gender:     Male                         Technician: [id number]  :        1980                   Requested By:LUDIVINA CAMACHO  Order #:    [id number]                    Reading MD: LUDIVINA CAMACHO MD    Measurements  Intervals                                Axis  Rate:       86                            P:          26  RI:         164                          QRS:        -8  QRSD:       88                           T:          46  QT:         376  QTc:        450    Interpretive Statements  12 Lead EKG interpreted by me to show: -- Rate 86 -- Rhythm: Normal sinus  rhythm  -- Axis: Normal -- RI and QRS Intervals: Normal -- T waves: No acute changes  --  ST segments: No acute changes -- Ectopy: None. My impression of this EKG:  Does  not indicate acute ischemia at this time.  Electronically Sig sidney On 8-8-2019 19:21:35 PDT by LUDIVINA CAMACHO MD          RADIOLOGY  MR-BRAIN-W/O   Final Result      1.  No evidence of acute territorial infarct, intracranial hemorrhage or mass lesion.   2.  Findings of sinusitis as described above.   3.  Trace left mastoid effusion. Findings could be consistent with mastoiditis in the appropriate clinical setting.      DX-CHEST-PORTABLE (1 VIEW)   Final Result      Mild cardiomegaly. No focal consolidation or pleural effusions.      CT-CTA HEAD WITH & W/O-POST PROCESS   Final Result      CT angiogram of the [place] within normal limits.      CT-CTA NECK WITH & W/O-POST PROCESSING   Final Result      No high-grade stenosis, large vessel occlusion, aneurysm or dissection.      CT-CEREBRAL PERFUSION ANALYSIS   Final Result      1.  Cerebral blood flow less than 30% likely representing completed infarct = 0 mL.      2.  T Max more than 6 seconds likely representing combination of completed infarct and ischemia = 0 mL.      3.  Mismatched volume likely representing ischemic brain/penumbra = 0 mL.      4.  Please note that the cerebral perfusion was performed on the limited brain tissue around the basal ganglia region. Infarct/ischemia outside the CT perfusion sections can be missed in this study.      CT-HEAD W/O   Final Result      No CT evidence of acute infarct, hemorrhage or mass.            COURSE & MEDICAL DECISION MAKING  I have reviewed any medical record information,  laboratory studies and radiographic results as noted above.  Differential diagnoses includes: CVA, TIA, ICH, anemia, electrolyte abnormalities, dehydration, anemia    Encounter Summary: This is a 38 y.o. male with headache associated with altered sensation of his face, some difficulty swallowing, word finding difficulty as well as tingling in his right hand and foot, all of which have improved significantly albeit not completely resolved at this point.  Onset was at 1215, 2 hours prior to arrival and he was made a stroke alert and I evaluated him in the resuscitation bay prior to imaging.  Will obtain the standard stroke imaging modalities including CT head, CTA head and neck and perfusion imaging, blood work and he will be reevaluated, my initial NIH is 0 -----CT, CTA perfusion studies are negative, blood work is unremarkable, evaluated by the neurologist, recommended for an MRI.  MRI is unremarkable for acute infarct, patient is discharged home in stable condition with follow-up with a primary care provider.      DISPOSITION: Discharged home in stable condition      FINAL IMPRESSION  1. Acute nonintractable headache, unspecified headache type    2. Difficulty with speech           This dictation was created using voice recognition software. The accuracy of the dictation is limited to the abilities of the software. I expect there may be some errors of grammar and possibly content. The nursing notes were reviewed and certain aspects of this information were incorporated into this note.    Electronically signed by: Micheal Porras, 8/8/2019 2:32 PM

## 2019-08-09 NOTE — ED NOTES
Patient states his HA has resolved, states he feels groggy. Just returned from MRI at this time. Resting in room with stable vitals otherwise.